# Patient Record
Sex: FEMALE | Race: OTHER | NOT HISPANIC OR LATINO | URBAN - METROPOLITAN AREA
[De-identification: names, ages, dates, MRNs, and addresses within clinical notes are randomized per-mention and may not be internally consistent; named-entity substitution may affect disease eponyms.]

---

## 2018-03-18 ENCOUNTER — EMERGENCY (EMERGENCY)
Facility: HOSPITAL | Age: 48
LOS: 1 days | Discharge: HOME | End: 2018-03-18
Attending: EMERGENCY MEDICINE | Admitting: EMERGENCY MEDICINE
Payer: SELF-PAY

## 2018-03-18 VITALS
OXYGEN SATURATION: 100 % | DIASTOLIC BLOOD PRESSURE: 79 MMHG | HEIGHT: 64 IN | RESPIRATION RATE: 16 BRPM | HEART RATE: 98 BPM | WEIGHT: 119.93 LBS | TEMPERATURE: 98 F | SYSTOLIC BLOOD PRESSURE: 121 MMHG

## 2018-03-18 PROCEDURE — 73000 X-RAY EXAM OF COLLAR BONE: CPT | Mod: 26,RT

## 2018-03-18 PROCEDURE — 73030 X-RAY EXAM OF SHOULDER: CPT

## 2018-03-18 PROCEDURE — 73030 X-RAY EXAM OF SHOULDER: CPT | Mod: 26,RT

## 2018-03-18 PROCEDURE — 99283 EMERGENCY DEPT VISIT LOW MDM: CPT

## 2018-03-18 PROCEDURE — 99284 EMERGENCY DEPT VISIT MOD MDM: CPT | Mod: 25

## 2018-03-18 PROCEDURE — 73000 X-RAY EXAM OF COLLAR BONE: CPT

## 2018-03-18 RX ORDER — IBUPROFEN 200 MG
600 TABLET ORAL ONCE
Qty: 0 | Refills: 0 | Status: COMPLETED | OUTPATIENT
Start: 2018-03-18 | End: 2018-03-18

## 2018-03-18 RX ADMIN — Medication 600 MILLIGRAM(S): at 14:38

## 2018-03-18 NOTE — ED PROVIDER NOTE - OBJECTIVE STATEMENT
49 yo female in room 4 accompanied by  for evaluation s/p slip and fall yesterday. Pt states "I was in [employer] yesterday when I slipped and fell  because there was spilled guacamole on the ground. I landed on my right shoulder and maybe hit my head". Pt reports NO LOC, moderate pain to right shoulder with full rom and right clavicle.  No obvious deformity noted.  Pt with upper back pain, non midline.

## 2018-03-18 NOTE — ED PROVIDER NOTE - PLAN OF CARE
REst, ice packs to alls ore areas for 20 min at a time  Return to the ER for any concerns  -- Please use 650mg Tylenol (also called acetaminophen) every 4 hours & 600mg Motrin (also called Advil or ibuprofen) every 6 hours as needed for pain/discomfort/swelling. You can get these without a prescription. Don't use more than 3500mg of Tylenol in any 24-hour period. Make sure your other prescription/over-the-counter medications don't contain any Tylenol so you don't take too much. If you have any stomach discomfort while taking Motrin, you can use TUMS or Pepcid or Zantac (these can all be bought without a prescription).   Follow up with orthopedist if pain persists  Dr. Bauer   REturn to the eR for any concerns R shoulder

## 2018-03-18 NOTE — ED PROVIDER NOTE - SKIN, MLM
Skin normal color for race, warm, dry and intact. No evidence of rash. no ecchymosis or erythema noted

## 2018-03-18 NOTE — ED PROVIDER NOTE - CARE PLAN
Principal Discharge DX:	Musculoskeletal pain  Assessment and plan of treatment:	REst, ice packs to alls ore areas for 20 min at a time  Return to the ER for any concerns  -- Please use 650mg Tylenol (also called acetaminophen) every 4 hours & 600mg Motrin (also called Advil or ibuprofen) every 6 hours as needed for pain/discomfort/swelling. You can get these without a prescription. Don't use more than 3500mg of Tylenol in any 24-hour period. Make sure your other prescription/over-the-counter medications don't contain any Tylenol so you don't take too much. If you have any stomach discomfort while taking Motrin, you can use TUMS or Pepcid or Zantac (these can all be bought without a prescription).   Follow up with orthopedist if pain persists  Dr. Bauer   REturn to the eR for any concerns  Secondary Diagnosis:	Contusion Principal Discharge DX:	Musculoskeletal pain  Goal:	R shoulder  Assessment and plan of treatment:	REst, ice packs to alls ore areas for 20 min at a time  Return to the ER for any concerns  -- Please use 650mg Tylenol (also called acetaminophen) every 4 hours & 600mg Motrin (also called Advil or ibuprofen) every 6 hours as needed for pain/discomfort/swelling. You can get these without a prescription. Don't use more than 3500mg of Tylenol in any 24-hour period. Make sure your other prescription/over-the-counter medications don't contain any Tylenol so you don't take too much. If you have any stomach discomfort while taking Motrin, you can use TUMS or Pepcid or Zantac (these can all be bought without a prescription).   Follow up with orthopedist if pain persists  Dr. Bauer   REturn to the eR for any concerns  Secondary Diagnosis:	Contusion

## 2018-03-18 NOTE — ED PROVIDER NOTE - MEDICAL DECISION MAKING DETAILS
s/p slip and fall - xray complete- no fx or dislocated- motrin given - f/u with ortho s/p slip and fall - xray complete- no fx or dislocated- motrin given - f/u with ortho  Attending Statement: Agree with the above.  Mechanical fall, R shoulder/clavicle pain, no AC TTP, does not seem c/w separation, more likely RTC.  Able to range joint but with some discomfort.  Does not require sling.  NSAIDs, XR to eval for fx, reassess.  --BMM

## 2024-01-08 ENCOUNTER — APPOINTMENT (OUTPATIENT)
Dept: ULTRASOUND IMAGING | Facility: CLINIC | Age: 54
End: 2024-01-08
Payer: COMMERCIAL

## 2024-01-08 ENCOUNTER — OUTPATIENT (OUTPATIENT)
Dept: OUTPATIENT SERVICES | Facility: HOSPITAL | Age: 54
LOS: 1 days | End: 2024-01-08
Payer: COMMERCIAL

## 2024-01-08 DIAGNOSIS — Z00.8 ENCOUNTER FOR OTHER GENERAL EXAMINATION: ICD-10-CM

## 2024-01-08 PROCEDURE — 77065 DX MAMMO INCL CAD UNI: CPT | Mod: 26,RT

## 2024-01-08 PROCEDURE — 19083 BX BREAST 1ST LESION US IMAG: CPT

## 2024-01-08 PROCEDURE — 88360 TUMOR IMMUNOHISTOCHEM/MANUAL: CPT

## 2024-01-08 PROCEDURE — 88305 TISSUE EXAM BY PATHOLOGIST: CPT

## 2024-01-08 PROCEDURE — 88305 TISSUE EXAM BY PATHOLOGIST: CPT | Mod: 26

## 2024-01-08 PROCEDURE — 88360 TUMOR IMMUNOHISTOCHEM/MANUAL: CPT | Mod: 26

## 2024-01-08 PROCEDURE — 77065 DX MAMMO INCL CAD UNI: CPT

## 2024-01-08 PROCEDURE — 19083 BX BREAST 1ST LESION US IMAG: CPT | Mod: RT

## 2024-01-17 ENCOUNTER — TRANSCRIPTION ENCOUNTER (OUTPATIENT)
Age: 54
End: 2024-01-17

## 2024-01-22 ENCOUNTER — OUTPATIENT (OUTPATIENT)
Dept: OUTPATIENT SERVICES | Facility: HOSPITAL | Age: 54
LOS: 1 days | End: 2024-01-22
Payer: COMMERCIAL

## 2024-01-22 ENCOUNTER — APPOINTMENT (OUTPATIENT)
Dept: MRI IMAGING | Facility: CLINIC | Age: 54
End: 2024-01-22
Payer: COMMERCIAL

## 2024-01-22 DIAGNOSIS — Z00.8 ENCOUNTER FOR OTHER GENERAL EXAMINATION: ICD-10-CM

## 2024-01-22 PROCEDURE — C8908: CPT

## 2024-01-22 PROCEDURE — 77049 MRI BREAST C-+ W/CAD BI: CPT | Mod: 26

## 2024-01-22 PROCEDURE — C8937: CPT

## 2024-01-22 PROCEDURE — A9585: CPT

## 2024-01-29 ENCOUNTER — APPOINTMENT (OUTPATIENT)
Dept: SURGICAL ONCOLOGY | Facility: CLINIC | Age: 54
End: 2024-01-29
Payer: COMMERCIAL

## 2024-01-29 VITALS
OXYGEN SATURATION: 99 % | HEIGHT: 64 IN | DIASTOLIC BLOOD PRESSURE: 76 MMHG | SYSTOLIC BLOOD PRESSURE: 122 MMHG | BODY MASS INDEX: 21.34 KG/M2 | HEART RATE: 81 BPM | WEIGHT: 125 LBS | RESPIRATION RATE: 17 BRPM

## 2024-01-29 PROCEDURE — 99205 OFFICE O/P NEW HI 60 MIN: CPT

## 2024-02-01 ENCOUNTER — APPOINTMENT (OUTPATIENT)
Dept: CT IMAGING | Facility: CLINIC | Age: 54
End: 2024-02-01

## 2024-02-06 ENCOUNTER — RESULT REVIEW (OUTPATIENT)
Age: 54
End: 2024-02-06

## 2024-02-06 DIAGNOSIS — C50.811 MALIGNANT NEOPLASM OF OVERLAPPING SITES OF RIGHT FEMALE BREAST: ICD-10-CM

## 2024-02-08 ENCOUNTER — APPOINTMENT (OUTPATIENT)
Dept: CT IMAGING | Facility: CLINIC | Age: 54
End: 2024-02-08
Payer: COMMERCIAL

## 2024-02-08 ENCOUNTER — OUTPATIENT (OUTPATIENT)
Dept: OUTPATIENT SERVICES | Facility: HOSPITAL | Age: 54
LOS: 1 days | End: 2024-02-08
Payer: COMMERCIAL

## 2024-02-08 DIAGNOSIS — C50.811 MALIGNANT NEOPLASM OF OVERLAPPING SITES OF RIGHT FEMALE BREAST: ICD-10-CM

## 2024-02-08 PROCEDURE — 74177 CT ABD & PELVIS W/CONTRAST: CPT

## 2024-02-08 PROCEDURE — 74177 CT ABD & PELVIS W/CONTRAST: CPT | Mod: 26

## 2024-02-12 ENCOUNTER — APPOINTMENT (OUTPATIENT)
Dept: NUCLEAR MEDICINE | Facility: CLINIC | Age: 54
End: 2024-02-12

## 2024-02-14 NOTE — REASON FOR VISIT
[Initial Consultation] : an initial consultation for [Other: _____] : [unfilled] [FreeTextEntry2] : Recently diagnosed cancer of the right breast (9:00), NO biopsy clip

## 2024-02-14 NOTE — PHYSICAL EXAM
[Normal] : supple, no neck mass and thyroid not enlarged [Normal Neck Lymph Nodes] : normal neck lymph nodes  [Normal Supraclavicular Lymph Nodes] : normal supraclavicular lymph nodes [Normal Axillary Lymph Nodes] : normal axillary lymph nodes [Normal] : normal appearance, no rash, nodules, vesicles, ulcers, erythema [de-identified] : Groins not examined [de-identified] : See diagram

## 2024-02-14 NOTE — ASSESSMENT
[FreeTextEntry1] : 53-year-old lady.  Newly diagnosed 2.3 cm invasive ductal carcinoma of the outer right breast. This was a palpable finding on BSE.  Diagnosis established on targeted ultrasound with core biopsy at [facility]. No biopsy clip placed. Disc returned to her.  BI-RADS 6 preoperative breast MRI at [facility]. + Equivocal finding on the right chest wall for which PET scan has been recommended.  I entered a prescription for her PET scan. I also entered prescriptions for screening mammography and sonography of both breast.  Genetic testing was submitted by Dr. Claudia Archer at [facility].  Breast conservation versus mastectomy (with or without reconstruction) reviewed in detail, all questions answered.  If the former is selected, I explained the benefit and value to having a biopsy clip placed prior to beginning neoadjuvant therapy.  Reviewed in detail, all questions answered.   2/14/2024: Summary note. 1.  The patient's insurance company denied the request for PET scan. 2.  Therefore, I will office submitted request for CT scans of the chest/abdomen/pelvis. 3.  Unfortunately only the abdomen pelvis CTs were performed, that was February 8, 2021. A ~2 cm growth is seen associated with the uterus for which a pelvic ultrasound is recommended. I spoke to her today. She understands and agrees. I entered a prescription for the recommended pelvic sonogram. My office will mail her a copy. 4.  The CT chest was not performed. I have verified the prescription. My office will send her a copy of that request the pelvic ultrasound prescription (above). 5.  The results of her genetic testing performed at [facility] are still pending. 6.  Receptor status Tumor: ER/NC negative. HER2 +. 7.  Presently she is reluctant to have a bone scan.

## 2024-02-14 NOTE — HISTORY OF PRESENT ILLNESS
[de-identified] : 53-year-old lady.  Referred by gynecology: Dr. Janis CIFUENTES for an additional opinion.  CC: Recently diagnosed RIGHT BREAST (9:00, 9 CM FN) CANCER.  She is already met with Dr. Oneida CHAVEZ.  Diagnosis established on sonogram guided core needle biopsy performed at [place]. T >1.2 cm. ER/IN negative. HER2 +.  NO BIOPSY CLIP PLACED, per patient request.   She is already met with medical oncology at [facility]: Dr. Claudia ESCALERA. The patient is reluctant to take chemotherapy. The doctor is planning to treat with Herceptin alone.   The breast lump was in asymptomatic palpable finding that she noticed on 2023. She contacted her gynecologist who arranged for a targeted ultrasound, and sonogram guided core needle biopsy that establish the diagnosis.   No recent mammography, or screening breast ultrasounds.   Post-procedure breast MRI at [facility]: BI-RADS 6. 1.  2.4 x 2.6 x 1.9 cm heterogeneously enhancing mass in the right breast (outer). The tumor ABUTS the pectoralis muscle. 2.  PET/CT recommended for further evaluation of an 11 x 4 mm T2 hyperintense enhancing lesion in the right intercostal region (mid anterior chest).................  She understands. Prescription provided for the recommended PET scan.  I also included prescriptions for the following: Bilateral mammography. Screening bilateral breast ultrasounds.   No previous procedures related to either breast.   No prior personal history of malignancy.   No relatives with breast cancer. No relatives with ovarian cancer.  Not Ashkenazi.  No family history of malignancy.   Menarche at 14.  2, para 2, first at 33. Cycles are irregular. No exogenous hormones.   She does not have an internist.  No known drug allergies.  No pacemaker or defibrillator. No anticoagulants.   GYN: Dr. Janis CIFUENTES. 2023 Pap smear was normal.   Medical oncology: Dr. Claudia ESCALERA.   She has never had a colonoscopy.

## 2024-03-03 ENCOUNTER — APPOINTMENT (OUTPATIENT)
Dept: CT IMAGING | Facility: IMAGING CENTER | Age: 54
End: 2024-03-03
Payer: COMMERCIAL

## 2024-03-03 ENCOUNTER — OUTPATIENT (OUTPATIENT)
Dept: OUTPATIENT SERVICES | Facility: HOSPITAL | Age: 54
LOS: 1 days | End: 2024-03-03
Payer: COMMERCIAL

## 2024-03-03 DIAGNOSIS — Z00.8 ENCOUNTER FOR OTHER GENERAL EXAMINATION: ICD-10-CM

## 2024-03-03 DIAGNOSIS — C50.811 MALIGNANT NEOPLASM OF OVERLAPPING SITES OF RIGHT FEMALE BREAST: ICD-10-CM

## 2024-03-03 PROCEDURE — 71260 CT THORAX DX C+: CPT | Mod: 26

## 2024-03-03 PROCEDURE — 71260 CT THORAX DX C+: CPT

## 2024-03-24 ENCOUNTER — APPOINTMENT (OUTPATIENT)
Dept: NUCLEAR MEDICINE | Facility: CLINIC | Age: 54
End: 2024-03-24

## 2025-02-12 ENCOUNTER — RESULT REVIEW (OUTPATIENT)
Age: 55
End: 2025-02-12

## 2025-02-12 ENCOUNTER — APPOINTMENT (OUTPATIENT)
Dept: ULTRASOUND IMAGING | Facility: CLINIC | Age: 55
End: 2025-02-12
Payer: COMMERCIAL

## 2025-02-12 ENCOUNTER — OUTPATIENT (OUTPATIENT)
Dept: OUTPATIENT SERVICES | Facility: HOSPITAL | Age: 55
LOS: 1 days | End: 2025-02-12
Payer: COMMERCIAL

## 2025-02-12 DIAGNOSIS — C50.811 MALIGNANT NEOPLASM OF OVERLAPPING SITES OF RIGHT FEMALE BREAST: ICD-10-CM

## 2025-02-12 DIAGNOSIS — Z00.8 ENCOUNTER FOR OTHER GENERAL EXAMINATION: ICD-10-CM

## 2025-02-12 PROCEDURE — 76641 ULTRASOUND BREAST COMPLETE: CPT

## 2025-02-12 PROCEDURE — 76641 ULTRASOUND BREAST COMPLETE: CPT | Mod: 26,50

## 2025-02-13 PROCEDURE — 76641 ULTRASOUND BREAST COMPLETE: CPT | Mod: 26,50
